# Patient Record
(demographics unavailable — no encounter records)

---

## 2025-07-15 NOTE — HISTORY OF PRESENT ILLNESS
[de-identified] : 07/15/2025 HPI: ANGELIA WU is a 48 year y/o F who presents for Left hip pain. Patient notes her pain can radiate from the left buttock into the lateral aspect of the left hip. Patient has been experiencing these symptoms for 5 months. Patient notes symptoms worsen with getting out of bed and getting up from seated position and made better with rest. patient also having difficulty with sleeping. Patient has attempted conservative measures including Tylenol and occasional Motrin but patient has GERD with previous Ulcer.   Patient also notes episodes of numbness/burning sensation through the LLE.    Patient has had a previous examination with an orthopedist about 8 years ago and diagnosed with GT bursitis.   Patient denies PMHx of DM. Patient is not on a blood thinner.     Patient presents today, 48 year F, for evaluation of their left leg pain Date of Injury/Onset: 5-6 months, worse last few weeks Mechanism of injury: NKI This is not a Work-Related Injury being treated under Worker's Compensation. This is not an athletic injury occurring associated with an interscholastic or organized sports team.   Pain: At Rest: up to 8 /10 With Activity: up to 9 /10 Quality of symptoms: left hip pain radiating to knee, pins and needles in the lower leg, numbness in the foot. spasms in lower leg. sometimes soreness into the groin. lower back pain   Affecting Sleep: Yes Stiffness upon waking, lasting greater than 30mins: Yes Difficulty with stairs: Yes, sometimes Difficulty getting in and out of car: Yes Difficulty applying shoe: Yes Sit to stand stiffness: Yes Affects walking short/long distances? Yes Home/Work/Recreation affected? Yes   Improves with:  back brace helps sometimes Worse with:  activity Have you been treated for this in the past? none recent - has seen orthopedist for bursitis in the past Have you had surgery for this in the past? patient notes surgery for endometriosis in area 10 years ago   OTC Medicines: Tylenol with no relief RX medicines:  none Heat, Ice, Elevation: heat   CSI or Gel Injections: None recent - has had BT hip CSI years ago Physical Therapy/ HEP: None   Previous Treatment/Imaging/Studies Since Last Visit: none

## 2025-07-15 NOTE — ASSESSMENT
[FreeTextEntry1] : ANGELIA WU is a 48 year y/o F with history and physical exam consistent with left hip greater trochanteric bursitis.   - Recommend physical therapy to regain range of motion, strengthening and symptomatic improvement. Prescription given in office today.   -  After discussing prescription anti-inflammatories, the patient is unable to take NSAIDs due to hx of gastric ulcers. Recommend Tylenol as needed for pain.    - Recommend rest, ice   Patient was educated on their diagnosis today. Emphasized the importance of gentle stretching and strengthening. Patient expressed understanding and all questions were answered today.   Follow up in 6 weeks to monitor progress with PT. We can consider a CSI at that time based off symptoms and response to PT. Will appreciate recs from the neurosurgeon at that time as well.   Patient is also established with a neurosurgeon - patient to f/u as scheduled to discuss low back and neck.

## 2025-07-15 NOTE — IMAGING
[de-identified] :  LEFT hip is examined.   Inspection no gross deformity, skin intact, no erythema Palpation; patient with - tenderness to palpation in the groin, ++ tenderness over the greater troches + TTP IT band ROM: 100 flexion, 20 IR with lateral based pain, 60 ER, 60 Abduction, 25 Adduction - [place] - FADIR, - PERNELL - SLR EHL 4/5 bilaterally  Otherwise, 5/5 motor to lower extremity Sensation intact to light touch throughout all lower extremity dermatomes No numbness in lateral femoral cutaneous nerve 2+ distal pulses   07/15: AP Pelvis, AP Hip and Frog lateral of LEFT hip. Mild joint line narrowing, subchondral sclerosis and mild osteophyte formation inferior and superior margin consistent with mild DJD. No fractures or dislocation noted.